# Patient Record
(demographics unavailable — no encounter records)

---

## 2024-12-13 NOTE — DISCUSSION/SUMMARY
[de-identified] : I examined, evaluated, and discussed with the patient. The patient has neck symptoms for 1 year. She also has left arm radiculopathy and left shoulder pathology. No myelopathy.    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   Treatment plan is medications PRN and PT and neck AMBAR in one week. Surgery option is also discussed today.    The patient understands everything and all questions were answered. The patient will return in 4 weeks.

## 2024-12-13 NOTE — HISTORY OF PRESENT ILLNESS
[de-identified] : The patient is 54 years old female who has neck pain for 1 year. The patient also has left arm pain. Acute on chronic. No clear cause of the symptoms. She went to [facility] ER.   Pain Level:  10 Duration of Symptoms:  1 year Symptom course:  Getting worse Accident:  No   Previous Treatment:    Pain meds:  Yes    Physical therapy:  Yes    Epidural steroid injection: Yes, one more in 1 week.

## 2024-12-13 NOTE — CONSULT LETTER
[Dear  ___] : Dear  [unfilled], [Consult Letter:] : I had the pleasure of evaluating your patient, [unfilled]. [Please see my note below.] : Please see my note below. [Consult Closing:] : Thank you very much for allowing me to participate in the care of this patient.  If you have any questions, please do not hesitate to contact me. [Sincerely,] : Sincerely, [FreeTextEntry3] : Mavis Purdy MD PhD

## 2024-12-13 NOTE — PHYSICAL EXAM
[de-identified] : The patient is alert, cooperative, and oriented x 3. Pupils are equal and round. The patient does not have skin rash.   Gait is normal. Neck ROM is within limited with pain. Tenderness at PVM. No myelopathy hand sign. Spurling test positive left side. DTR normal range. Motor and sensory are basically normal throughout bilateral U/E and L/E. Circulation of legs is normal. Bladder and bowel functions are normal.  Left shoulder: ROM limited with pain, no swelling, no clear tenderness. [de-identified] : Cervical spine CT and MRI taken recently at [facility] shows degenerative changes C3-7 with foraminal stenosis C4-7.

## 2025-04-29 NOTE — PHYSICAL EXAM
[Pes Cavus] : pes cavus deformity [de-identified] : Pain with palpation to plantar medial tubercle of bilaterally. Gastroc soleal equinus on silferskiold exam.  [FreeTextEntry1] : Nails are mycotic, thickened painful and discolored [FreeTextEntry8] : Tingling sensation down superficial peronela nerve L side
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
## 2025-04-29 NOTE — PHYSICAL EXAM
[Pes Cavus] : pes cavus deformity [de-identified] : Pain with palpation to plantar medial tubercle of bilaterally. Gastroc soleal equinus on silferskiold exam.  [FreeTextEntry1] : Nails are mycotic, thickened painful and discolored [FreeTextEntry8] : Tingling sensation down superficial peronela nerve L side

## 2025-04-29 NOTE — PROCEDURE
[FreeTextEntry1] : The patient presents with symptoms consistent with plantar fasciitis of both feet as well as sciatica. Exam and evaluation were performed.  Radiographs reviewed and discussed. Radiographs ordered, 3 views bilaterally. Small enthesophytes noted bilaterally.  Discussed etiology, treatment options, and prognosis with the patient. Role of the patients foot structure resulting in relative lack of arch support, Achilles tightness, and resulting equinus contracture as well as the tightening of the plantar fascia itself as causative factors were reviewed. Reviewed the role of accommodating shoes and foot orthoses. Currently refusing cortisone injection.                Rest, ice, and stretch as instructed using a night splint. Prescription Medication Management Initiate Treatment: Initiate treatment with prescription medication. OTC Medication Management  Initiate OTC Treatment: Initiate treatment with OTC medication.  Patient presents with likely Onychomycosis. Exam and evaluation were performed. Possible etiology discussed. Etiologies include fungal elements versus microtrauma to the nail plate. Nail specimen obtained for pathology analysis as noted, r/o onychomycosis. Nail specimen obtained with sterile clipper from left hallux. Placed into sterile cup for pathology.  Treatment options for onychomycosis including topical, oral medications, and LASER treatments discussed.  Pros and cons of each modality discussed. All questions answered to the patients satisfaction. The patient will return in 1-2 weeks for the review of biopsy results and to discuss further treatment options.

## 2025-06-19 NOTE — HISTORY OF PRESENT ILLNESS
[de-identified] : 55 year old female presents for evaluation of B/L shoulder pain since last year after a fall down the stairs in June 2023. Initially, the pain was thought to be stemming from the neck from a prior WC injury in Aug 2021. She also had a fall two weeks ago where she fell on both outstretched arms that worsened her pain. She notes that the pain is at the anterior aspect of the shoulder which is chronic, but she also notes that the acute worse pain is at the upper arm by the tricep, elbow and forearm. Has numbness/tingling of the fingers. She also complains of right anterior shoulder pain as well. Pain is worse with standing and walking. She takes meloxicam for the pain. She is currently doing PT for the left shoulder, which she states is worsening her pain.  She is followed by Dr. Artemio Cuellar of pain management for the neck and lower back, treated with ESIs.
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
## 2025-06-19 NOTE — PROCEDURE
[de-identified] : Injection: Left shoulder Subacromial Space. Indication: Impingement.  A discussion was had with the patient regarding this procedure and all questions were answered. All risks, benefits and alternatives were discussed. These included but were not limited to bleeding, infection, and allergic reaction.  A timeout was done to ensure correct side and pt agreed to the procedure.   Alcohol was used to clean the skin, and betadine was used to sterilize and prep the area in the posterior aspect of the shoulder. Ethyl chloride spray was then used as a topical anesthetic. A 22-gauge 1.5" needle was used to inject 2cc of 0.25% bupivacaine and 1cc of 40mg/ml methylprednisolone into the subacromial space. A sterile bandage was then applied. The patient tolerated the procedure well and there were no complications.

## 2025-06-19 NOTE — PHYSICAL EXAM
[de-identified] : Constitutional: Well-nourished, well-developed, No acute distress Respiratory:  Good respiratory effort, no SOB Psychiatric: Pleasant and normal affect, alert and oriented x3 Skin: Clean dry and intact B/L UE Musculoskeletal: normal except where as noted in regional exam  Left Shoulder: APPEARANCE: no marked deformities, no swelling or malalignment POSITIVE TENDERNESS: supraspinatus NONTENDER:  infraspinatus, teres minor. biceps. anterior and posterior capsule. AC joint.  ROM: full with mild painful arc past 60 degrees, no scapular winging or dyskinesia present RESISTIVE TESTING: MMT 4+/5 ER and empty can, 5/5 IR. painless 5/5 resisted flex/ext, horizontal abd/add  SPECIAL TESTS: + Soler and Neers, mildly + cross arm adduction, neg Speeds, neg Ruby's neg Drop Arm, neg Apprehension. neg apley's scratch test  Right Shoulder: APPEARANCE: no marked deformities, no swelling or malalignment POSITIVE TENDERNESS: supraspinatus NONTENDER:  infraspinatus, teres minor. biceps. anterior and posterior capsule. AC joint.  ROM: full with mild painful arc past 60 degrees, no scapular winging or dyskinesia present RESISTIVE TESTING: MMT 4+/5 ER and empty can, 5/5 IR. painless 5/5 resisted flex/ext, horizontal abd/add  SPECIAL TESTS: + Soler and Neers, mildly + cross arm adduction, neg Speeds, neg Ruby's neg Drop Arm, neg Apprehension. neg apley's scratch test  Left elbow: APPEARANCE: no marked deformities, no swelling or malalignment POSITIVE TENDERNESS: + lateral and medial epicondyles, lateral common extensor muscle bundle, medial common flexor bundle, distal lateral upper arm, lateral forearm overlying the extensor muscles NONTENDER: , posterior capsule, and other areas of the elbow.  ROM: full & painless flexion/extension, pronation/supination.  RESISTIVE TESTING: Painful 4/5 resisted elbow flexion/extension, wrist/long finger extension, wrist/long finger flexion, resisted supination/pronation.  SPECIAL TESTS: stable v/v stress. neg tinel's cubital tunnel   [de-identified] : The following radiographs were ordered and read by me during this patient's visit. I reviewed each radiograph in detail with the patient and discussed the findings as highlighted below.   3 views of the left shoulder were obtained today that show no fracture, or dislocation. There are no degenerative changes seen. There is no malalignment. No obvious osseous abnormality. Otherwise unremarkable.  3 views of the right shoulder were obtained today that show no fracture, or dislocation. There are no degenerative changes seen. There is no malalignment. No obvious osseous abnormality. Otherwise unremarkable.  3 views of the left elbow were obtained today that show no fracture, or dislocation. There are no degenerative changes seen. There is no malalignment. No obvious osseous abnormality. Otherwise unremarkable.  ________________ MRI LEFT SHOULDER WITHOUT CONTRAST   3/27/25   ([facility])   HISTORY:  Principal complaint of left shoulder pain accompanied by numbness in the left arm  TECHNIQUE:  Multiplanar, multi-sequence MRI of the left shoulder was obtained on a 1.2T scanner according to standard protocol.  COMPARISON:  None available  FINDINGS:  OSSEOUS STRUCTURES Bone marrow signal is unremarkable. No fracture or osteonecrosis.  ROTATOR CUFF Tendinosis affects the supraspinatus and infraspinatus tendons without evidence of tear. The subscapularis tendon demonstrates tendinosis with an associated tear. The teres minor tendon appears intact. Mild subacromial-subdeltoid bursitis is present. Rotator cuff muscles maintain normal bulk without significant atrophy or edema.  LONG BICIPITAL TENDON The long head of the biceps tendon is intact and maintained within the bicipital groove without subluxation or dislocation.  GLENOHUMERAL JOINT Glenohumeral articular cartilage appears preserved. The superior posterosuperior labrum appears attenuated and frayed, consistent with degenerative changes. No joint effusion or synovitis. No capsular thickening.  ACROMIOCLAVICULAR JOINT AND ROTATOR CUFF OUTLET Mild degenerative arthropathy involves the acromioclavicular joint, characterized by minor osteophyte formation and articular surface irregularity. The acromion morphology is unremarkable without spurring. No os acromiale. The coracoacromial ligament appears normal.  OTHER Quadrilateral and axillary spaces appear patent. Visualized portions of the chest wall are unremarkable.  IMPRESSION: 1. Subscapularis tendon tear associated with tendinosis. Supraspinatus and infraspinatus tendons demonstrate tendinosis without tear. These rotator cuff abnormalities may contribute to shoulder pain and functional limitation.  2. Mild degenerative arthropathy of the acromioclavicular joint.  3. Mild subacromial-subdeltoid bursitis, potentially related to rotator cuff pathology or impingement.  4. No intrinsic shoulder abnormality definitively explains the reported arm numbness; correlation with potential extrinsic causes, such as cervical spine pathology, may be considered depending on the clinical picture.